# Patient Record
Sex: MALE | Race: WHITE | NOT HISPANIC OR LATINO
[De-identification: names, ages, dates, MRNs, and addresses within clinical notes are randomized per-mention and may not be internally consistent; named-entity substitution may affect disease eponyms.]

---

## 2019-02-26 ENCOUNTER — OTHER (OUTPATIENT)
Age: 44
End: 2019-02-26

## 2019-03-01 ENCOUNTER — APPOINTMENT (OUTPATIENT)
Dept: ORTHOPEDIC SURGERY | Facility: CLINIC | Age: 44
End: 2019-03-01
Payer: MEDICAID

## 2019-03-01 VITALS
HEIGHT: 67 IN | SYSTOLIC BLOOD PRESSURE: 126 MMHG | WEIGHT: 205 LBS | BODY MASS INDEX: 32.18 KG/M2 | DIASTOLIC BLOOD PRESSURE: 79 MMHG | HEART RATE: 96 BPM

## 2019-03-01 DIAGNOSIS — S83.241A OTHER TEAR OF MEDIAL MENISCUS, CURRENT INJURY, RIGHT KNEE, INITIAL ENCOUNTER: ICD-10-CM

## 2019-03-01 DIAGNOSIS — Z87.39 PERSONAL HISTORY OF OTHER DISEASES OF THE MUSCULOSKELETAL SYSTEM AND CONNECTIVE TISSUE: ICD-10-CM

## 2019-03-01 PROCEDURE — 99204 OFFICE O/P NEW MOD 45 MIN: CPT

## 2019-03-01 PROCEDURE — 73562 X-RAY EXAM OF KNEE 3: CPT

## 2019-03-01 RX ORDER — ALPRAZOLAM 2 MG/1
TABLET ORAL
Refills: 0 | Status: ACTIVE | COMMUNITY

## 2019-03-01 NOTE — REVIEW OF SYSTEMS
[Joint Pain] : joint pain [Joint Stiffness] : joint stiffness [Joint Swelling] : joint swelling [Anxiety] : anxiety [Depression] : depression [Sleep Disturbances] : ~T sleep disturbances [Negative] : Heme/Lymph [FreeTextEntry9] : right knee

## 2019-03-01 NOTE — HISTORY OF PRESENT ILLNESS
[Pain Location] : pain [Worsening] : worsening [___ mths] : [unfilled] month(s) ago [4] : a current pain level of 4/10 [5] : an average pain level of 5/10 [2] : a minimum pain level of 2/10 [8] : a maximum pain level of 8/10 [Walking] : worsened by walking [Rest] : relieved by rest [Recumbency] : relieved by recumbency [de-identified] : 43 year old male presents with right knee pain for 2 months. He notes pain in the medial aspect of the right knee, which is sharp and achy. The pain is sometimes throbbing. He notes that resting alleviates the pain, but walking and bending makes the pain worse.\par He had left knee scope in 2014 with good outcome, and is able to play hockey following his arthroscopy. He denies pain in the left knee. He notes that he is not consuming alcohol at this time.\par He also notes pain in the arch of his left foot that is severe in the morning. \par \par

## 2019-03-01 NOTE — ADDENDUM
[FreeTextEntry1] : I, Brad Kenyon, acted solely as a scribe for Dr. Bridger Dunham on this date 03/01/2019.

## 2019-03-01 NOTE — PHYSICAL EXAM
[Antalgic] : antalgic [LE] : Sensory: Intact in bilateral lower extremities [ALL] : dorsalis pedis, posterior tibial, femoral, popliteal, and radial 2+ and symmetric bilaterally [Obese] : obese [Poor Appearance] : well-appearing [Acute Distress] : not in acute distress [de-identified] : GENERAL APPEARANCE: Well nourished and hydrated, pleasant, alert, and oriented x 3. Appears their stated age. \par HEENT: Normocephalic, extraocular eye motion intact. Nasal septum midline. Oral cavity clear. External auditory canal clear. \par RESPIRATORY: Breath sounds clear and audible in all lobes. No wheezing, No accessory muscle use.\par CARDIOVASCULAR: No apparent abnormalities. No lower leg edema. No varicosities. Pedal pulses are palpable.\par NEUROLOGIC: Sensation is normal, no muscle weakness in the upper or lower extremities.\par DERMATOLOGIC: No apparent skin lesions, moist, warm, no rash.\par SPINE: Cervical spine appears normal and moves freely; thoracic spine appears normal and moves freely; lumbosacral spine appears normal and moves freely, normal, nontender.\par MUSCULOSKELETAL: Hands, wrists, and elbows are normal and move freely, shoulders are normal and move freely.  [de-identified] : examination of his right knee shows clear medial joint line pain and tenderness. Has a small effusion. He has pain with Lopez's maneuver. His knee appears stable to valgus stress and Lachman's maneuver. He is pain-free gentle logrolling of his hip. He walks with an antalgic gait which is mild.  [de-identified] : 3V Xray of the right knee done in office today and reviewed by Dr. Bridger Dunham demonstrates mild tricompartmental osteoarthritis

## 2019-03-01 NOTE — DISCUSSION/SUMMARY
[de-identified] : 43 year old male presents with mild tricompartmental osteoarthritis of the right knee. He is also s/p left knee arthroscopy, and he is progressing well after his surgery. We discussed the nature of the condition and the treatment options. Based on his imaging and symptoms, I ordered an MRI of the right knee to evaluate internal derangement, possible medial meniscus tear. \par \par I also referred him to a podiatrist, Dr. Butterfield for his left foot symptoms. \par \par The percentages of success in an arthroscopy that involves a torn meniscus and arthritic changes is dependent upon how bad the arthritic changes are. Basically, removing a meniscal tear allows us to ascertain how bad the patient's articular cartilage destruction (arthritis) is. The arthroscopy cleans out any debris from the arthritic process as well as removing the meniscal tear. Approximately 75% of the patients will say that they feel relief, although their x-rays will continue to show significant arthritic changes. Arthroscopy for arthritis is a temporizing procedure, yielding subjective success (patient satisfaction) for less than two to five years. In some cases, the knee might eventually require a knee replacement for symptomatic relief. The prognostic factors that are somewhat favorable predictive values in arthroscopic debridements (removal of loose articular cartilage, loose body and inflamed synovium) of an arthritic knee are: short duration of symptoms, effusion (swelling), minimal deformity and good range of motion. The complications with any arthroscopy include the risk of anaesthetic complications and death, blood clots and pulmonary embolus, infection (less than 1%), nerve damage, by which we would mean a peroneal palsy (less than 0.1%) (small area of skin numbness is so common, we do not consider its presence a complication), injury to the popliteal artery, which is so rare that there are no statistics, but should it occur could theoretically lead to amputation, which is extremely unlikely. There is often a chance of getting a hemarthrosis (blood in the joint) but this usually resolves with local measures of icing, physical therapy, and aspiration. Reflex sympathetic dystrophy (RSD) is another extremely rare but theoretical complication. This (RSD) means that the patient has a stiff painful joint that is out of proportion to the objective pathology of the knee. Subsequently, it might require years of physical therapy before one regains a functional knee with RSD. Infrapatellar contracture syndrome (stiff joint) is sometimes reported and associated with RSD, but it usually is a result of not being aggressive in physical therapy. I think the patient understands the risk benefit ratio of arthroscopy and will think about whether they would prefer the nonoperative or surgical treatment option. \par \par \par

## 2019-03-18 ENCOUNTER — FORM ENCOUNTER (OUTPATIENT)
Age: 44
End: 2019-03-18

## 2019-03-19 ENCOUNTER — APPOINTMENT (OUTPATIENT)
Dept: MRI IMAGING | Facility: CLINIC | Age: 44
End: 2019-03-19
Payer: MEDICAID

## 2019-03-19 ENCOUNTER — OUTPATIENT (OUTPATIENT)
Dept: OUTPATIENT SERVICES | Facility: HOSPITAL | Age: 44
LOS: 1 days | End: 2019-03-19

## 2019-03-19 DIAGNOSIS — Z98.89 OTHER SPECIFIED POSTPROCEDURAL STATES: Chronic | ICD-10-CM

## 2019-03-19 DIAGNOSIS — S83.241A OTHER TEAR OF MEDIAL MENISCUS, CURRENT INJURY, RIGHT KNEE, INITIAL ENCOUNTER: ICD-10-CM

## 2019-03-19 PROCEDURE — 73721 MRI JNT OF LWR EXTRE W/O DYE: CPT | Mod: 26,RT

## 2019-04-17 ENCOUNTER — OTHER (OUTPATIENT)
Age: 44
End: 2019-04-17

## 2019-10-31 ENCOUNTER — TRANSCRIPTION ENCOUNTER (OUTPATIENT)
Age: 44
End: 2019-10-31

## 2020-05-14 ENCOUNTER — APPOINTMENT (OUTPATIENT)
Dept: ORTHOPEDIC SURGERY | Facility: CLINIC | Age: 45
End: 2020-05-14
Payer: MEDICAID

## 2020-05-14 VITALS — TEMPERATURE: 98.4 F

## 2020-05-14 VITALS
SYSTOLIC BLOOD PRESSURE: 157 MMHG | HEIGHT: 67 IN | BODY MASS INDEX: 36.1 KG/M2 | WEIGHT: 230 LBS | DIASTOLIC BLOOD PRESSURE: 99 MMHG | HEART RATE: 99 BPM

## 2020-05-14 DIAGNOSIS — M25.561 PAIN IN RIGHT KNEE: ICD-10-CM

## 2020-05-14 PROCEDURE — 99214 OFFICE O/P EST MOD 30 MIN: CPT | Mod: 25

## 2020-05-14 PROCEDURE — 20610 DRAIN/INJ JOINT/BURSA W/O US: CPT | Mod: RT

## 2020-05-14 PROCEDURE — 73562 X-RAY EXAM OF KNEE 3: CPT | Mod: RT

## 2020-05-14 RX ORDER — INDOMETHACIN 50 MG/1
50 CAPSULE ORAL 3 TIMES DAILY
Qty: 15 | Refills: 0 | Status: ACTIVE | COMMUNITY
Start: 2020-05-14 | End: 1900-01-01

## 2020-05-14 NOTE — DISCUSSION/SUMMARY
[Medication Risks Reviewed] : Medication risks reviewed [de-identified] : 44 year old  male with moderate medial osteoarthritis, medial meniscus tear, and subchondral insufficiency fracture of the right knee. \par He has a large effusion and persistent pain in the right knee. He has developed moderate osteoarthritis. I think a new MRI is appropriate to reevaluate the stress fracture of his medial tibial plateau and his medial meniscus. The updated MRI will help us determine if he would benefit from a right knee arthroscopy. He elected to receive a cortisone injection in his right knee which he tolerated well. We also provided him with a prescription for a five-day course of indomethacin. \par He will contact the office when right knee MRI result is available.\par \par The percentages of success in an arthroscopy that involves a torn meniscus and arthritic changes is dependent upon how bad the arthritic changes are. Basically, removing a meniscal tear allows us to ascertain how bad the patient's articular cartilage destruction (arthritis) is. The arthroscopy cleans out any debris from the arthritic process as well as removing the meniscal tear. Approximately 75% of the patients will say that they feel relief, although their x-rays will continue to show significant arthritic changes. Arthroscopy for arthritis is a temporizing procedure, yielding subjective success (patient satisfaction) for less than two to five years. In some cases, the knee might eventually require a knee replacement for symptomatic relief. The prognostic factors that are somewhat favorable predictive values in arthroscopic debridements (removal of loose articular cartilage, loose body and inflamed synovium) of an arthritic knee are: short duration of symptoms, effusion (swelling), minimal deformity and good range of motion. The complications with any arthroscopy include the risk of anaesthetic complications and death, blood clots and pulmonary embolus, infection (less than 1%), nerve damage, by which we would mean a peroneal palsy (less than 0.1%) (small area of skin numbness is so common, we do not consider its presence a complication), injury to the popliteal artery, which is so rare that there are no statistics, but should it occur could theoretically lead to amputation, which is extremely unlikely. There is often a chance of getting a hemarthrosis (blood in the joint) but this usually resolves with local measures of icing, physical therapy, and aspiration. Reflex sympathetic dystrophy (RSD) is another extremely rare but theoretical complication. This (RSD) means that the patient has a stiff painful joint that is out of proportion to the objective pathology of the knee. Subsequently, it might require years of physical therapy before one regains a functional knee with RSD. Infrapatellar contracture syndrome (stiff joint) is sometimes reported and associated with RSD, but it usually is a result of not being aggressive in physical therapy. I think the patient understands the risk benefit ratio of arthroscopy and will think about whether they would prefer the nonoperative or surgical treatment option. \par \par \par

## 2020-05-14 NOTE — PHYSICAL EXAM
[Normal] : Gait: normal [LE] : Sensory: Intact in bilateral lower extremities [ALL] : Biceps, brachioradialis, triceps, patellar, ankle and plantar 2+ and symmetric bilaterally [Antalgic] : antalgic [de-identified] : GENERAL APPEARANCE: Well nourished and hydrated, pleasant, alert, and oriented x 3. Appears their stated age. \par HEENT: Normocephalic, extraocular eye motion intact. Nasal septum midline. Oral cavity clear. External auditory canal clear. \par RESPIRATORY: Breath sounds clear and audible in all lobes. No wheezing, No accessory muscle use.\par CARDIOVASCULAR: No apparent abnormalities. No lower leg edema. No varicosities. Pedal pulses are palpable.\par NEUROLOGIC: Sensation is normal, no muscle weakness in the upper or lower extremities.\par DERMATOLOGIC: No apparent skin lesions, moist, warm, no rash.\par SPINE: Cervical spine appears normal and moves freely; thoracic spine appears normal and moves freely; lumbosacral spine appears normal and moves freely, normal, nontender.\par MUSCULOSKELETAL: Hands, wrists, and elbows are normal and move freely, shoulders are normal and move freely.  [de-identified] : Right knee exam shows moderate effusion. ROM is 0-90 degrees. medial joint line tenderness. [de-identified] : MRI of the right knee obtained 3/21/2019 at [facility] shows\par bone marrow edema in the medial femoral condyle with underlying concavity, consistent with subchondral insufficiency fracture.\par Linear foci of increased signal in the anterior horn medial meniscus likely representing tear. Underlying bone marrow edema in the anterior medial tibial plateau.\par High-grade chondrosis at the posterior medial tibial plateau with underlying bone marrow edema. Low-grade fissuring in the medial patellar facet and chondrosis in the lateral patella facet with small subchondral cyst.\par Small knee joint effusion. \par \par 3V Xray of the right knee done in office today and reviewed by Dr. Bridger Dunham demonstrates \par moderate medial compartment osteoarthritis of the right knee. \par \par

## 2020-05-14 NOTE — ADDENDUM
[FreeTextEntry1] : I, Brad Kenyon, acted solely as a scribe for Dr. Bridger Dunham on this date 05/14/2020.

## 2020-05-14 NOTE — PROCEDURE
[de-identified] : I injected the patient's right knee today with cortisone.\par \par I discussed at length with the patient the planned steroid and lidocaine injection. The risks, benefits, convalescence and alternatives were reviewed. The possible side effects discussed included but were not limited to: pain, swelling, heat, bleeding, and redness. Symptoms are generally mild but if they are extensive then contact the office. Giving pain relievers by mouth such as NSAIDs or Tylenol can generally treat the reactions to steroid and lidocaine. Rare cases of infection have been noted. Rash, hives and itching may occur post injection. If you have muscle pain or cramps, flushing and or swelling of the face, rapid heart beat, nausea, dizziness, fever, chills, headache, difficulty breathing, swelling in the arms or legs, or have a prickly feeling of your skin, contact a health care provider immediately. Following this discussion, the knee was prepped with Alcohol and under sterile condition the 80 mg Depo-Medrol and 6 cc Lidocaine injection was performed with a 20 gauge needle through a superolateral injection site. The needle was introduced into the joint, aspiration was performed to ensure intra-articular placement and the medication was injected. Upon withdrawal of the needle the site was cleaned with alcohol and a band aid applied. The patient tolerated the injection well and there were no adverse effects. Post injection instructions included no strenuous activity for 24 hours, cryotherapy and if there are any adverse effects to contact the office. \par \par \par

## 2020-05-14 NOTE — HISTORY OF PRESENT ILLNESS
[Pain Location] : pain [___ yrs] : [unfilled] year(s) ago [5] : a current pain level of 5/10 [Worsening] : worsening [de-identified] : 44 year old male here for follow-up evaluation of right knee pain. He localizes pain to the medial aspect of the knee. He rates pain 5/10 in severity. He reports swelling and difficulty with walking. He is not able to exercise due to right knee pain, and notes recent weight gain. He has attempted cortisone injections in the past with some relief. Pt taking Advil with some relief. \par Pt had MRI of the right knee in March 2019 showing medial meniscus tear, bone marrow edema, subchondral insufficiency fracture.\par He is s/p left knee arthroscopy from 2014 and left knee is doing well. \par

## 2020-05-14 NOTE — REVIEW OF SYSTEMS
[Joint Pain] : joint pain [Negative] : Psychiatric [Joint Swelling] : joint swelling [FreeTextEntry9] : right knee

## 2020-05-28 ENCOUNTER — APPOINTMENT (OUTPATIENT)
Dept: MRI IMAGING | Facility: CLINIC | Age: 45
End: 2020-05-28
Payer: MEDICAID

## 2020-05-28 ENCOUNTER — OUTPATIENT (OUTPATIENT)
Dept: OUTPATIENT SERVICES | Facility: HOSPITAL | Age: 45
LOS: 1 days | End: 2020-05-28
Payer: MEDICAID

## 2020-05-28 DIAGNOSIS — Z98.89 OTHER SPECIFIED POSTPROCEDURAL STATES: Chronic | ICD-10-CM

## 2020-05-28 DIAGNOSIS — M17.11 UNILATERAL PRIMARY OSTEOARTHRITIS, RIGHT KNEE: ICD-10-CM

## 2020-05-28 DIAGNOSIS — Z00.00 ENCOUNTER FOR GENERAL ADULT MEDICAL EXAMINATION WITHOUT ABNORMAL FINDINGS: ICD-10-CM

## 2020-05-28 PROCEDURE — 73721 MRI JNT OF LWR EXTRE W/O DYE: CPT

## 2020-05-28 PROCEDURE — 73721 MRI JNT OF LWR EXTRE W/O DYE: CPT | Mod: 26,RT

## 2020-06-05 ENCOUNTER — APPOINTMENT (OUTPATIENT)
Dept: ORTHOPEDIC SURGERY | Facility: CLINIC | Age: 45
End: 2020-06-05

## 2020-06-15 ENCOUNTER — TRANSCRIPTION ENCOUNTER (OUTPATIENT)
Age: 45
End: 2020-06-15

## 2020-06-15 ENCOUNTER — APPOINTMENT (OUTPATIENT)
Dept: ORTHOPEDIC SURGERY | Facility: CLINIC | Age: 45
End: 2020-06-15
Payer: MEDICAID

## 2020-06-15 DIAGNOSIS — S83.249A OTHER TEAR OF MEDIAL MENISCUS, CURRENT INJURY, UNSPECIFIED KNEE, INITIAL ENCOUNTER: ICD-10-CM

## 2020-06-15 PROCEDURE — 99214 OFFICE O/P EST MOD 30 MIN: CPT | Mod: 95

## 2020-06-15 NOTE — HISTORY OF PRESENT ILLNESS
[Home] : at home, [unfilled] , at the time of the visit. [Medical Office: ([facility])___] : at the medical office located in  [Verbal consent obtained from patient] : the patient, [unfilled] [de-identified] : 44 year old male here for follow-up evaluation of right knee pain. He localizes pain to the medial aspect of the knee. He rates pain 5/10 in severity. He reports swelling and difficulty with walking. He is not able to exercise due to right knee pain, and notes recent weight gain. He has attempted cortisone injections in the past with some relief. Pt taking Advil with some relief. \par Pt had MRI of the right knee in March 2019 showing medial meniscus tear, bone marrow edema, subchondral insufficiency fracture.\par He is s/p left knee arthroscopy from 2014 and left knee is doing well. \par  \par He had a repeat MRI just recently it confirms a complex medial meniscus tear involves the posterior horn he has mild medial compartment osteoarthritis, he has a moderate knee effusion\par \par \par KATHERINE GARZA presents with pain. He states the symptoms are worsening. The patient mentions the symptoms started 1 year(s) ago. Pain levels include a current pain level of 5/10.  [5] : a current pain level of 5/10 [3] : a minimum pain level of 3/10 [7] : a maximum pain level of 7/10 [Walking] : walking [Bending] : worsened by bending [Direct Pressure] : worsened by direct pressure [Knee Flexion] : worsened with knee flexion [Knee Extension] : worsened with knee extension [Ataxia] : no ataxia [Incontinence] : no incontinence [Loss of Dexterity] : good dexterity [Urinary Ret.] : no urinary retention

## 2020-06-15 NOTE — PHYSICAL EXAM
[Normal RLE] : Right Lower Extremity: No scars, rashes, lesions, ulcers, skin intact [Normal Finger/nose] : finger to nose coordination [Normal] : Alert and in no acute distress [de-identified] : Well developed well nourished patient in no acute distress.  alert and oriented x 3. normal mood and affect. skin without rashes.  breathing non-labored. normal balance and gait.  \par The patient is 5ft 7in 230 pounds with a BMI 36.0.\par The knee itself shows mild effusion with range of motion is preserved in neutral alignment [de-identified] : His MRI was reviewed again it shows a medial meniscus tear which is a complex medial meniscus tear involving the posterior horn he does have faint edema in the far medial aspect of the medial tibial plateau but the prior subchondral fracture on the medial femoral condyle has resolved

## 2020-06-15 NOTE — DISCUSSION/SUMMARY
[Surgical risks reviewed] : Surgical risks reviewed [de-identified] : The patient is a 45-year-old male with persistent right knee pain he has a medial meniscus tear he has mild edema in the medial tibial plateau but his previous subchondral fracture has resolved leaving only a residual cortical contour defect.\par I do think he is a reasonable candidate for right knee arthroscopy and partial meniscectomy.\par \par \par The percentages of success in an arthroscopy that involves a torn meniscus and arthritic changes is dependent upon how bad the arthritic changes are. Basically, removing a meniscal tear allows us to ascertain how bad the patient's articular cartilage destruction (arthritis) is. The arthroscopy cleans out any debris from the arthritic process as well as removing the meniscal tear. Approximately 75% of the patients will say that they feel relief, although their x-rays will continue to show significant arthritic changes. Arthroscopy for arthritis is a temporizing procedure, yielding subjective success (patient satisfaction) for less than two to five years. In some cases, the knee might eventually require a knee replacement for symptomatic relief. The prognostic factors that are somewhat favorable predictive values in arthroscopic debridements (removal of loose articular cartilage, loose body and inflamed synovium) of an arthritic knee are: short duration of symptoms, effusion (swelling), minimal deformity and good range of motion. The complications with any arthroscopy include the risk of anaesthetic complications and death, blood clots and pulmonary embolus, infection (less than 1%), nerve damage, by which we would mean a peroneal palsy (less than 0.1%) (small area of skin numbness is so common, we do not consider its presence a complication), injury to the popliteal artery, which is so rare that there are no statistics, but should it occur could theoretically lead to amputation, which is extremely unlikely. There is often a chance of getting a hemarthrosis (blood in the joint) but this usually resolves with local measures of icing, physical therapy, and aspiration. Reflex sympathetic dystrophy (RSD) is another extremely rare but theoretical complication. This (RSD) means that the patient has a stiff painful joint that is out of proportion to the objective pathology of the knee. Subsequently, it might require years of physical therapy before one regains a functional knee with RSD. Infrapatellar contracture syndrome (stiff joint) is sometimes reported and associated with RSD, but it usually is a result of not being aggressive in physical therapy. I think the patient understands the risk benefit ratio of arthroscopy and will think about whether they would prefer the nonoperative or surgical treatment option.

## 2020-10-01 ENCOUNTER — APPOINTMENT (OUTPATIENT)
Dept: ORTHOPEDIC SURGERY | Facility: HOSPITAL | Age: 45
End: 2020-10-01

## 2020-10-14 ENCOUNTER — APPOINTMENT (OUTPATIENT)
Dept: ORTHOPEDIC SURGERY | Facility: CLINIC | Age: 45
End: 2020-10-14

## 2020-10-27 ENCOUNTER — APPOINTMENT (OUTPATIENT)
Dept: ORTHOPEDIC SURGERY | Facility: HOSPITAL | Age: 45
End: 2020-10-27

## 2020-11-11 ENCOUNTER — APPOINTMENT (OUTPATIENT)
Dept: ORTHOPEDIC SURGERY | Facility: CLINIC | Age: 45
End: 2020-11-11

## 2020-11-17 ENCOUNTER — APPOINTMENT (OUTPATIENT)
Dept: ORTHOPEDIC SURGERY | Facility: CLINIC | Age: 45
End: 2020-11-17

## 2020-12-08 ENCOUNTER — APPOINTMENT (OUTPATIENT)
Dept: ORTHOPEDIC SURGERY | Facility: CLINIC | Age: 45
End: 2020-12-08

## 2021-01-04 ENCOUNTER — NON-APPOINTMENT (OUTPATIENT)
Age: 46
End: 2021-01-04

## 2021-01-07 ENCOUNTER — APPOINTMENT (OUTPATIENT)
Dept: ORTHOPEDIC SURGERY | Facility: HOSPITAL | Age: 46
End: 2021-01-07

## 2021-01-22 ENCOUNTER — APPOINTMENT (OUTPATIENT)
Dept: ORTHOPEDIC SURGERY | Facility: CLINIC | Age: 46
End: 2021-01-22

## 2021-02-23 ENCOUNTER — APPOINTMENT (OUTPATIENT)
Dept: ORTHOPEDIC SURGERY | Facility: CLINIC | Age: 46
End: 2021-02-23

## 2021-03-19 ENCOUNTER — APPOINTMENT (OUTPATIENT)
Dept: ORTHOPEDIC SURGERY | Facility: CLINIC | Age: 46
End: 2021-03-19
Payer: MEDICAID

## 2021-03-19 VITALS
SYSTOLIC BLOOD PRESSURE: 148 MMHG | HEIGHT: 67 IN | BODY MASS INDEX: 36.1 KG/M2 | TEMPERATURE: 98.1 F | HEART RATE: 88 BPM | WEIGHT: 230 LBS | DIASTOLIC BLOOD PRESSURE: 88 MMHG

## 2021-03-19 DIAGNOSIS — M17.11 UNILATERAL PRIMARY OSTEOARTHRITIS, RIGHT KNEE: ICD-10-CM

## 2021-03-19 PROCEDURE — 20610 DRAIN/INJ JOINT/BURSA W/O US: CPT | Mod: RT

## 2021-03-19 PROCEDURE — 99072 ADDL SUPL MATRL&STAF TM PHE: CPT

## 2021-03-19 PROCEDURE — 99213 OFFICE O/P EST LOW 20 MIN: CPT | Mod: 25

## 2021-03-19 NOTE — HISTORY OF PRESENT ILLNESS
[Pain Location] : pain [Worsening] : worsening [___ yrs] : [unfilled] year(s) ago [5] : a current pain level of 5/10 [de-identified] : 45 year old male here for follow-up evaluation of right knee pain.\par the pain is in the medial knee\par he reports swelling and difficulty with walking.\par Pt had MRI of the right knee in March 2019 showing medial meniscus tear, bone marrow edema, subchondral insufficiency fracture \par he updated MRI on 5/28/2020 showed  a complex medial meniscus tear involves the posterior horn he has mild medial compartment osteoarthritis, he has a moderate knee effusion\par \par He is s/p left knee arthroscopy from 2014 and left knee is doing well. \par

## 2021-03-19 NOTE — PHYSICAL EXAM
[de-identified] : GENERAL APPEARANCE: Well nourished and hydrated, pleasant, alert, and oriented x 3. Appears their stated age. \par HEENT: Normocephalic, extraocular eye motion intact. Nasal septum midline. Oral cavity clear. External auditory canal clear. \par RESPIRATORY: Breath sounds clear and audible in all lobes. No wheezing, No accessory muscle use.\par CARDIOVASCULAR: No apparent abnormalities. No lower leg edema. No varicosities. Pedal pulses are palpable.\par NEUROLOGIC: Sensation is normal, no muscle weakness in the upper or lower extremities.\par DERMATOLOGIC: No apparent skin lesions, moist, warm, no rash.\par SPINE: Cervical spine appears normal and moves freely; thoracic spine appears normal and moves freely; lumbosacral spine appears normal and moves freely, normal, nontender.\par MUSCULOSKELETAL: Hands, wrists, and elbows are normal and move freely, shoulders are normal and move freely. \par Musculoskeletal: Gait: normal and antalgic . Right knee exam shows moderate effusion. ROM is 0-90 degrees. medial joint line tenderness. \par 5/5 motor strength in bilateral lower extremities. Sensory: Intact in bilateral lower extremities. DTRs: Biceps, brachioradialis, triceps, patellar, ankle and plantar 2+ and symmetric bilaterally. Pulses: dorsalis pedis, posterior tibial, femoral, popliteal, and radial 2+ and symmetric bilaterally. \par

## 2021-03-19 NOTE — DISCUSSION/SUMMARY
[de-identified] : Surgical risks reviewed. The patient is a 45-year-old male with persistent right knee pain he has a medial meniscus tear he has mild edema in the medial tibial plateau but his previous subchondral fracture has resolved leaving only a residual cortical contour defect.\par I do think he is a reasonable candidate for right knee arthroscopy and partial meniscectomy.\par he received 2nd cortisone injection in the right knee today, pt will return to office in 6-8 weeks and discuss arthroscopy.\par \par \par The percentages of success in an arthroscopy that involves a torn meniscus and arthritic changes is dependent upon how bad the arthritic changes are. Basically, removing a meniscal tear allows us to ascertain how bad the patient's articular cartilage destruction (arthritis) is. The arthroscopy cleans out any debris from the arthritic process as well as removing the meniscal tear. Approximately 75% of the patients will say that they feel relief, although their x-rays will continue to show significant arthritic changes. Arthroscopy for arthritis is a temporizing procedure, yielding subjective success (patient satisfaction) for less than two to five years. In some cases, the knee might eventually require a knee replacement for symptomatic relief. The prognostic factors that are somewhat favorable predictive values in arthroscopic debridements (removal of loose articular cartilage, loose body and inflamed synovium) of an arthritic knee are: short duration of symptoms, effusion (swelling), minimal deformity and good range of motion. The complications with any arthroscopy include the risk of anaesthetic complications and death, blood clots and pulmonary embolus, infection (less than 1%), nerve damage, by which we would mean a peroneal palsy (less than 0.1%) (small area of skin numbness is so common, we do not consider its presence a complication), injury to the popliteal artery, which is so rare that there are no statistics, but should it occur could theoretically lead to amputation, which is extremely unlikely. There is often a chance of getting a hemarthrosis (blood in the joint) but this usually resolves with local measures of icing, physical therapy, and aspiration. Reflex sympathetic dystrophy (RSD) is another extremely rare but theoretical complication. This (RSD) means that the patient has a stiff painful joint that is out of proportion to the objective pathology of the knee. Subsequently, it might require years of physical therapy before one regains a functional knee with RSD. Infrapatellar contracture syndrome (stiff joint) is sometimes reported and associated with RSD, but it usually is a result of not being aggressive in physical therapy. I think the patient understands the risk benefit ratio of arthroscopy and will think about whether they would prefer the nonoperative or surgical treatment option. \par

## 2021-03-24 ENCOUNTER — APPOINTMENT (OUTPATIENT)
Dept: ORTHOPEDIC SURGERY | Facility: CLINIC | Age: 46
End: 2021-03-24

## 2021-05-07 ENCOUNTER — APPOINTMENT (OUTPATIENT)
Dept: ORTHOPEDIC SURGERY | Facility: CLINIC | Age: 46
End: 2021-05-07